# Patient Record
Sex: FEMALE | Employment: OTHER | URBAN - METROPOLITAN AREA
[De-identification: names, ages, dates, MRNs, and addresses within clinical notes are randomized per-mention and may not be internally consistent; named-entity substitution may affect disease eponyms.]

---

## 2017-05-16 ENCOUNTER — HOSPITAL ENCOUNTER (OUTPATIENT)
Dept: LAB | Age: 62
Discharge: HOME | End: 2017-05-16
Payer: COMMERCIAL

## 2017-05-16 ENCOUNTER — OFFICE VISIT (OUTPATIENT)
Dept: HEMATOLOGY | Age: 62
End: 2017-05-16

## 2017-05-16 VITALS
OXYGEN SATURATION: 98 % | HEART RATE: 69 BPM | WEIGHT: 143.2 LBS | DIASTOLIC BLOOD PRESSURE: 71 MMHG | RESPIRATION RATE: 16 BRPM | TEMPERATURE: 97.4 F | HEIGHT: 65 IN | SYSTOLIC BLOOD PRESSURE: 120 MMHG | BODY MASS INDEX: 23.86 KG/M2

## 2017-05-16 DIAGNOSIS — B18.2 CHRONIC HEPATITIS C WITHOUT HEPATIC COMA (HCC): ICD-10-CM

## 2017-05-16 DIAGNOSIS — B18.2 CHRONIC HEPATITIS C WITHOUT HEPATIC COMA (HCC): Primary | ICD-10-CM

## 2017-05-16 PROBLEM — Z90.710 S/P TAH (TOTAL ABDOMINAL HYSTERECTOMY): Status: ACTIVE | Noted: 2017-05-16

## 2017-05-16 PROBLEM — L65.9 ALOPECIA: Status: ACTIVE | Noted: 2017-05-16

## 2017-05-16 PROBLEM — F32.A DEPRESSION: Status: ACTIVE | Noted: 2017-05-16

## 2017-05-16 LAB
ALBUMIN SERPL BCP-MCNC: 3.6 G/DL (ref 3.4–5)
ALBUMIN/GLOB SERPL: 1.1 {RATIO} (ref 0.8–1.7)
ALP SERPL-CCNC: 61 U/L (ref 45–117)
ALT SERPL-CCNC: 65 U/L (ref 13–56)
ANION GAP BLD CALC-SCNC: 10 MMOL/L (ref 3–18)
AST SERPL W P-5'-P-CCNC: 88 U/L (ref 15–37)
BASOPHILS # BLD AUTO: 0 K/UL (ref 0–0.1)
BASOPHILS # BLD: 0 % (ref 0–3)
BILIRUB DIRECT SERPL-MCNC: 0.1 MG/DL (ref 0–0.2)
BILIRUB SERPL-MCNC: 0.4 MG/DL (ref 0.2–1)
BUN SERPL-MCNC: 8 MG/DL (ref 7–18)
BUN/CREAT SERPL: 10 (ref 12–20)
CALCIUM SERPL-MCNC: 9.1 MG/DL (ref 8.5–10.1)
CHLORIDE SERPL-SCNC: 106 MMOL/L (ref 100–108)
CO2 SERPL-SCNC: 28 MMOL/L (ref 21–32)
CREAT SERPL-MCNC: 0.84 MG/DL (ref 0.6–1.3)
DIFFERENTIAL METHOD BLD: ABNORMAL
EOSINOPHIL # BLD: 0 K/UL (ref 0–0.4)
EOSINOPHIL NFR BLD: 1 % (ref 0–5)
ERYTHROCYTE [DISTWIDTH] IN BLOOD BY AUTOMATED COUNT: 12.6 % (ref 11.6–14.5)
ETHANOL SERPL-MCNC: 24 MG/DL (ref 0–3)
FERRITIN SERPL-MCNC: 638 NG/ML (ref 8–388)
GLOBULIN SER CALC-MCNC: 3.4 G/DL (ref 2–4)
GLUCOSE SERPL-MCNC: 86 MG/DL (ref 74–99)
HCT VFR BLD AUTO: 36.6 % (ref 35–45)
HGB BLD-MCNC: 12.5 G/DL (ref 12–16)
IRON SATN MFR SERPL: 38 %
IRON SERPL-MCNC: 125 UG/DL (ref 50–175)
LYMPHOCYTES # BLD AUTO: 73 % (ref 20–51)
LYMPHOCYTES # BLD: 2.8 K/UL (ref 0.8–3.5)
MCH RBC QN AUTO: 31.7 PG (ref 24–34)
MCHC RBC AUTO-ENTMCNC: 34.2 G/DL (ref 31–37)
MCV RBC AUTO: 92.9 FL (ref 74–97)
MONOCYTES # BLD: 0.3 K/UL (ref 0–1)
MONOCYTES NFR BLD AUTO: 7 % (ref 2–9)
NEUTS SEG # BLD: 0.7 K/UL (ref 1.8–8)
NEUTS SEG NFR BLD AUTO: 19 % (ref 42–75)
PLATELET # BLD AUTO: 159 K/UL (ref 135–420)
PMV BLD AUTO: 10.9 FL (ref 9.2–11.8)
POTASSIUM SERPL-SCNC: 3.7 MMOL/L (ref 3.5–5.5)
PROT SERPL-MCNC: 7 G/DL (ref 6.4–8.2)
RBC # BLD AUTO: 3.94 M/UL (ref 4.2–5.3)
RBC MORPH BLD: ABNORMAL
SODIUM SERPL-SCNC: 144 MMOL/L (ref 136–145)
TIBC SERPL-MCNC: 332 UG/DL (ref 250–450)
WBC # BLD AUTO: 3.8 K/UL (ref 4.6–13.2)

## 2017-05-16 PROCEDURE — 86706 HEP B SURFACE ANTIBODY: CPT | Performed by: INTERNAL MEDICINE

## 2017-05-16 PROCEDURE — 82728 ASSAY OF FERRITIN: CPT | Performed by: INTERNAL MEDICINE

## 2017-05-16 PROCEDURE — 86704 HEP B CORE ANTIBODY TOTAL: CPT | Performed by: INTERNAL MEDICINE

## 2017-05-16 PROCEDURE — 80048 BASIC METABOLIC PNL TOTAL CA: CPT | Performed by: INTERNAL MEDICINE

## 2017-05-16 PROCEDURE — 87900 PHENOTYPE INFECT AGENT DRUG: CPT | Performed by: INTERNAL MEDICINE

## 2017-05-16 PROCEDURE — 86708 HEPATITIS A ANTIBODY: CPT | Performed by: INTERNAL MEDICINE

## 2017-05-16 PROCEDURE — 82107 ALPHA-FETOPROTEIN L3: CPT | Performed by: INTERNAL MEDICINE

## 2017-05-16 PROCEDURE — 83540 ASSAY OF IRON: CPT | Performed by: INTERNAL MEDICINE

## 2017-05-16 PROCEDURE — 80307 DRUG TEST PRSMV CHEM ANLYZR: CPT | Performed by: INTERNAL MEDICINE

## 2017-05-16 PROCEDURE — 36415 COLL VENOUS BLD VENIPUNCTURE: CPT | Performed by: INTERNAL MEDICINE

## 2017-05-16 PROCEDURE — 87522 HEPATITIS C REVRS TRNSCRPJ: CPT | Performed by: INTERNAL MEDICINE

## 2017-05-16 PROCEDURE — 85025 COMPLETE CBC W/AUTO DIFF WBC: CPT | Performed by: INTERNAL MEDICINE

## 2017-05-16 PROCEDURE — 87902 NFCT AGT GNTYP ALYS HEP C: CPT | Performed by: INTERNAL MEDICINE

## 2017-05-16 PROCEDURE — 80076 HEPATIC FUNCTION PANEL: CPT | Performed by: INTERNAL MEDICINE

## 2017-05-16 PROCEDURE — 87521 HEPATITIS C PROBE&RVRS TRNSC: CPT | Performed by: INTERNAL MEDICINE

## 2017-05-16 NOTE — MR AVS SNAPSHOT
Visit Information Date & Time Provider Department Dept. Phone Encounter #  
 5/16/2017 10:30 AM Althea Rae MD [address] [id number] Upcoming Health Maintenance Date Due Hepatitis C Screening 1955 Pneumococcal 19-64 Medium Risk (1 of 1 - PPSV23) 10/29/1976 DTaP/Tdap/Td series (1 - Tdap) 10/29/1978 PAP AKA CERVICAL CYTOLOGY 10/29/1978 BREAST CANCER SCRN MAMMOGRAM 10/29/2007 FOBT Q 1 YEAR AGE 50-75 10/29/2007 INFLUENZA AGE 9 TO ADULT 8/1/2017 Allergies as of 5/16/2017  Review Complete On: 5/16/2017 By: Althea Rae MD  
  
 Severity Noted Reaction Type Reactions Alcohol  06/05/2015    Hives, Itching Wool alcohol, found in lotions and soaps Other Medication  06/02/2015    Hives Antibiotic,  \"Nedrine\" Current Immunizations  Never Reviewed No immunizations on file. Not reviewed this visit You Were Diagnosed With   
  
 Codes Comments Chronic hepatitis C without hepatic coma (HCC)    -  Primary ICD-10-CM: B18.2 ICD-9-CM: 070.54 Vitals BP Pulse Temp Resp Height(growth percentile) 120/71 (BP 1 Location: Left arm, BP Patient Position: Sitting) 69 97.4 °F (36.3 °C) (Tympanic) 16 5' 5\" (1.651 m) Weight(growth percentile) SpO2 BMI OB Status Smoking Status 143 lb 3.2 oz (65 kg) 98% 23.83 kg/m2 Hysterectomy Current Every Day Smoker Vitals History BMI and BSA Data Body Mass Index Body Surface Area  
 23.83 kg/m 2 1.73 m 2 Preferred Pharmacy Pharmacy Name Phone [id number], [address] [place] [address]. [phone] Your Updated Medication List  
  
Notice  As of 5/16/2017 11:36 AM  
 You have not been prescribed any medications. To-Do List   
 05/16/2017 Lab:  AFP WITH AFP-L3% 05/16/2017 Lab:  CBC WITH AUTOMATED DIFF   
  
 05/16/2017 Lab:  ETHYL ALCOHOL   
  
 05/16/2017 Lab:  FERRITIN   
  
 05/16/2017 Lab:  HCV NS5A DRUG RESISTANCE ASSAY   
  
 05/16/2017 Lab:  HCV RNA BY NORBERT QL,RFLX TO QT   
  
 05/16/2017 Lab:  HEP A AB, TOTAL   
  
 05/16/2017 Lab:  HEP B SURFACE AB   
  
 05/16/2017 Lab:  HEP C GENOTYPE   
  
 05/16/2017 Lab:  HEPATIC FUNCTION PANEL   
  
 05/16/2017 Lab:  HEPATITIS B CORE AB, TOTAL   
  
 05/16/2017 Lab:  IRON PROFILE   
  
 05/16/2017 Lab:  METABOLIC PANEL, BASIC   
  
 05/16/2017 Imaging:  US ABD LTD W ELASTOGRAPHY Introducing [facility]! Melodie Strickland introduces [facility] patient portal. Now you can access parts of your medical record, email your doctor's office, and request medication refills online. 1. In your internet browser, go to [email or web address] 2. Click on the First Time User? Click Here link in the Sign In box. You will see the New Member Sign Up page. 3. Enter your [facility] Access Code exactly as it appears below. You will not need to use this code after youve completed the sign-up process. If you do not sign up before the expiration date, you must request a new code. · [facility] Access Code: [email or web address] Expires: 8/14/2017 11:36 AM 
 
4. Enter the last four digits of your Social Security Number (xxxx) and Date of Birth (mm/dd/yyyy) as indicated and click Submit. You will be taken to the next sign-up page. 5. Create a [facility] ID. This will be your [facility] login ID and cannot be changed, so think of one that is secure and easy to remember. 6. Create a [facility] password. You can change your password at any time. 7. Enter your Password Reset Question and Answer. This can be used at a later time if you forget your password. 8. Enter your e-mail address. You will receive e-mail notification when new information is available in [address] 9. Click Sign Up. You can now view and download portions of your medical record. 10. Click the Download Summary menu link to download a portable copy of your medical information. If you have questions, please visit the Frequently Asked Questions section of the [employer] website. Remember, [facility] is NOT to be used for urgent needs. For medical emergencies, dial 911. Now available from your iPhone and Android! Please provide this summary of care documentation to your next provider. Your primary care clinician is listed as Floyd Cruz. If you have any questions after today's visit, please call [phone].

## 2017-05-16 NOTE — PROGRESS NOTES
Laine Byrnes MD, IBAN Victoria PA-C Iantha Hooker, MD, FACP, MD Rhiannon Salas NP Soundra Barbara, NP        [address] [facility], [address] [address]  22.     [phone]     FAX: [facility], [address] [place] [address]     [phone]     FAX: [phone]         Patient Care Team:  Mariya Luong MD as PCP - General (Family Practice)      Problem List  Date Reviewed: 5/16/2017          Codes Class Noted    Chronic hepatitis C ([facility] [address] ICD-10-CM: B18.2  ICD-9-CM: 070.54  5/16/2017        Depression ICD-10-CM: F32.9  ICD-9-CM: 697  5/16/2017        Alopecia ICD-10-CM: L65.9  ICD-9-CM: 704.00  5/16/2017        S/P ALKA (total abdominal hysterectomy) ICD-10-CM: Z90.710  ICD-9-CM: V88.01  5/16/2017        Hidradenitis suppurativa of left axilla ICD-10-CM: L73.2  ICD-9-CM: 705.83  6/5/2015                The physicians listed above have asked me to see Ezequiel De Leon in consultation regarding chronic HCV and its management. All medical records sent by the referring physicians were reviewed     The patient is a 63 y.o. Black female who was noted to have abnormalities in liver chemistries and subsequently tested positive for chronic HCV in 4/2017. Risk factors for acquiring HCV are inhaling cocaine in 1970s. There was no history of acute incteric hepatitis at the time of these risk factors. Imaging of the liver was either not performed or the results are not available to me. An assessment of liver fibrosis with biopsy or elastography has not been performed. The patient has never received treatment for chronic HCV. The most recent laboratory studies are not available. The patient has no symptoms which can be attributed to the liver disorder.     The patient has not experienced pain in the right side over the liver,     The patient has Moderate limitations in functional activities which can be attributed to to other medical problems that are not related to the liver disease. ALLERGIES  Allergies   Allergen Reactions    Alcohol Hives and Itching     Wool alcohol, found in lotions and soaps    Other Medication Hives     Antibiotic,  \"Nedrine\"       MEDICATIONS  No current outpatient prescriptions on file. No current facility-administered medications for this visit. SYSTEM REVIEW NOT RELATED TO LIVER DISEASE OR REVIEWED ABOVE:  Constitution systems: Negative for fever, chills, weight gain, weight loss. Eyes: Negative for visual changes. ENT: Negative for sore throat, painful swallowing. Respiratory: Negative for cough, hemoptysis, SOB. Cardiology: Negative for chest pain, palpitations. GI:  Negative for constipation or diarrhea. : Negative for urinary frequency, dysuria, hematuria, nocturia. Skin: Negative for rash. Hematology: Negative for easy bruising, blood clots. Musculo-skelatal: Negative for back pain, muscle pain, weakness. Neurologic: Negative for headaches, dizziness, vertigo, memory problems not related to HE. Psychology: Negative for anxiety, depression. FAMILY HISTORY:  The patient has no knowledge of the father's medical condition. The mother has the following chronic diseases: HTN. There is no family history of liver disease. SOCIAL HISTORY:  The patient is . The spouse has not been tested for HCV   The patient has 3 children, and 5 grandchildren. The patient currently smokes half pack of tobacco daily. The patient consumes 2 pints plus some beer weekly. The patient used to work as a  for [employer]. The patient retired in 2013.         PHYSICAL EXAMINATION:  /71 (BP 1 Location: Left arm, BP Patient Position: Sitting)  Pulse 69  Temp 97.4 °F (36.3 °C) (Tympanic)   Resp 16  Ht 5' 5\" (1.651 m)  Wt 143 lb 3.2 oz (65 kg)  SpO2 98%  BMI 23.83 kg/m2  General: No acute distress. Eyes: Sclera anicteric. ENT: No oral lesions. Thyroid normal.  Nodes: No adenopathy. Skin: No spider angiomata. No jaundice. No palmar erythema. Respiratory: Lungs clear to auscultation. Cardiovascular: Regular heart rate. No murmurs. No JVD. Abdomen: Soft non-tender. Liver size normal to percussion/palpation. Spleen not palpable. No obvious ascites. Extremities: No edema. No muscle wasting. No gross arthritic changes. Neurologic: Alert and oriented. Cranial nerves grossly intact. No asterixis. LABORATORY STUDIES:  [facility] [place] [address] & Units 5/16/2017   WBC 4.6 - 13.2 K/uL 3.8 (L)   ANC 1.8 - 8.0 K/UL 0.7 (L)   HGB 12.0 - 16.0 g/dL 12.5    - 420 K/uL 159   AST 15 - 37 U/L 88 (H)   ALT 13 - 56 U/L 65 (H)   Alk Phos 45 - 117 U/L 61   Bili, Total 0.2 - 1.0 MG/DL 0.4   Bili, Direct 0.0 - 0.2 MG/DL 0.1   Albumin 3.4 - 5.0 g/dL 3.6   BUN 7.0 - 18 MG/DL 8   Creat 0.6 - 1.3 MG/DL 0.84   Na 136 - 145 mmol/L 144   K 3.5 - 5.5 mmol/L 3.7   Cl 100 - 108 mmol/L 106   CO2 21 - 32 mmol/L 28   Glucose 74 - 99 mg/dL 86     SEROLOGIES:  Serologies Latest Ref Rng & Units 5/16/2017   Hep A Ab, Total NEGATIVE   NEGATIVE   Hep B Core Ab, Total NEGATIVE   NEGATIVE   Hep B Surface Ab >10.0 mIU/mL <3.10 (L)   Hep B Surface Ab Interp POS   NEGATIVE (A)   Hep C Genotype  1b   HCV RT-PCR, Quant IU/mL [id number]   Ferritin 8 - 388 NG/ (H)   Iron % Saturation % 38     LIVER HISTOLOGY:  Not available or performed    ENDOSCOPIC PROCEDURES:  Not available or performed    RADIOLOGY:  Not available or performed    OTHER TESTING:  Not available or performed    ASSESSMENT AND PLAN:  Chronic HCV of unclear severity. Liver function is normal.  The platelet count is normal.      Have performed laboratory testing to monitor liver function and degree of liver injury. This included BMP, hepatic panel, CBC with platelet count,     Will perform and/or review results of HCV viral load and HCV genotype to define the specific treatment and duration of treatment that will be required. Will perform serologic and virologic studies to assess for other causes of chronic liver disease. Will perform imaging of the liver with ultrasound. The need to assess liver fibrosis was discussed. Sheer wave elastography can assess liver fibrosis and determine if a patient has advanced fibrosis or cirrhosis without the need for liver biopsy. Sheer wave elast[facility] is [facility] available at [facility]. This will be scheduled. The patient has not previously been treated for HCV. The patient has HCV genotype 1B. Discussed the treatment alternatives. The SVR/cure rate for HCV now exceeds 90% with just oral anti-viral therapy and no interferon injections or significant side effects for most patients with HCV. The specific treatment is dependent upon genotype, viral load and histology. The patient should be treated with Sharlet Rincon or [employer]. The SVR/cure rate for either of these regimens exceeds 95%. The patient was directed to continue all current medications at the current dosages. There are no contraindications for the patient to take any medications that are necessary for treatment of other medical issues. The patient was counseled regarding alcohol consumption. The patient was counseled regarding use of illicit drugs. Vaccination for viral hepatitis A and B is recommended since the patient has no serologic evidence of previous exposure or vaccination with immunity. All of the above issues were discussed with the patient. All questions were answered. The patient expressed a clear understanding of the above. [facility] 87 in 4 weeks to initiate HCV treatment.     Devan Brown MD  [facility] [place] [facility] [place]    [facility], [address] [place] [address], [facility] [address]  [phone]

## 2017-05-17 LAB
AFP L3 MFR SERPL: 4.7 % (ref 0–9.9)
AFP SERPL-MCNC: 7.9 NG/ML (ref 0–8)
HAV AB SER QL IA: NEGATIVE
HBV CORE AB SERPL QL IA: NEGATIVE
HBV SURFACE AB SER QL IA: NEGATIVE
HBV SURFACE AB SERPL IA-ACNC: <3.1 MIU/ML
HEP BS AB COMMENT,HBSAC: ABNORMAL

## 2017-05-18 LAB
HCV RNA SERPL NAA+PROBE-ACNC: NORMAL IU/ML
HCV RNA SERPL NAA+PROBE-LOG IU: 6.13 LOG10 IU/ML
HCV RNA SERPL QL NAA+PROBE: POSITIVE
TEST INFORMATION:, 550031: NORMAL

## 2017-05-20 LAB
HCV GENTYP SERPL NAA+PROBE: NORMAL
PLEASE NOTE, 550474: NORMAL

## 2017-05-26 LAB
HCV NS5 MUT DET ISLT GENOTYP: NORMAL
HCV RESIS PANELL ISLT GENOTYP: NORMAL
REF LAB TEST METHOD: NORMAL

## 2017-06-01 ENCOUNTER — TELEPHONE (OUTPATIENT)
Dept: HEMATOLOGY | Age: 62
End: 2017-06-01

## 2017-06-06 ENCOUNTER — HOSPITAL ENCOUNTER (OUTPATIENT)
Dept: ULTRASOUND IMAGING | Age: 62
Discharge: HOME | End: 2017-06-06
Attending: INTERNAL MEDICINE
Payer: COMMERCIAL

## 2017-06-06 ENCOUNTER — TELEPHONE (OUTPATIENT)
Dept: HEMATOLOGY | Age: 62
End: 2017-06-06

## 2017-06-06 DIAGNOSIS — B18.2 CHRONIC HEPATITIS C WITHOUT HEPATIC COMA (HCC): ICD-10-CM

## 2017-06-06 PROCEDURE — 0346T US ABD LTD W ELASTOGRAPHY: CPT

## 2017-06-12 NOTE — TELEPHONE ENCOUNTER
Returned pts call re lab results. Pt verbalized understanding, we will see pt at next scheduled f/u.

## 2017-07-24 ENCOUNTER — HOSPITAL ENCOUNTER (OUTPATIENT)
Dept: LAB | Age: 62
Discharge: HOME | End: 2017-07-24
Payer: COMMERCIAL

## 2017-07-24 PROCEDURE — 87070 CULTURE OTHR SPECIMN AEROBIC: CPT | Performed by: SURGERY

## 2017-07-24 PROCEDURE — 87077 CULTURE AEROBIC IDENTIFY: CPT | Performed by: SURGERY

## 2017-07-26 LAB
BACTERIA SPEC CULT: ABNORMAL
BACTERIA SPEC CULT: ABNORMAL
GRAM STN SPEC: ABNORMAL
GRAM STN SPEC: ABNORMAL
SERVICE CMNT-IMP: ABNORMAL

## 2017-09-19 ENCOUNTER — HOSPITAL ENCOUNTER (OUTPATIENT)
Dept: LAB | Age: 62
Discharge: HOME | End: 2017-09-19
Payer: COMMERCIAL

## 2017-09-19 ENCOUNTER — OFFICE VISIT (OUTPATIENT)
Dept: HEMATOLOGY | Age: 62
End: 2017-09-19

## 2017-09-19 VITALS
DIASTOLIC BLOOD PRESSURE: 66 MMHG | TEMPERATURE: 98.2 F | RESPIRATION RATE: 16 BRPM | BODY MASS INDEX: 23.16 KG/M2 | OXYGEN SATURATION: 96 % | HEART RATE: 71 BPM | HEIGHT: 65 IN | SYSTOLIC BLOOD PRESSURE: 136 MMHG | WEIGHT: 139 LBS

## 2017-09-19 DIAGNOSIS — B18.2 CHRONIC HEPATITIS C WITHOUT HEPATIC COMA (HCC): ICD-10-CM

## 2017-09-19 DIAGNOSIS — B18.2 CHRONIC HEPATITIS C WITHOUT HEPATIC COMA (HCC): Primary | ICD-10-CM

## 2017-09-19 LAB
ALBUMIN SERPL-MCNC: 3.6 G/DL (ref 3.4–5)
ALBUMIN/GLOB SERPL: 1 {RATIO} (ref 0.8–1.7)
ALP SERPL-CCNC: 63 U/L (ref 45–117)
ALT SERPL-CCNC: 72 U/L (ref 13–56)
ANION GAP SERPL CALC-SCNC: 10 MMOL/L (ref 3–18)
AST SERPL-CCNC: 71 U/L (ref 15–37)
BASOPHILS # BLD: 0 K/UL (ref 0–0.06)
BASOPHILS NFR BLD: 0 % (ref 0–2)
BILIRUB DIRECT SERPL-MCNC: 0.1 MG/DL (ref 0–0.2)
BILIRUB SERPL-MCNC: 0.5 MG/DL (ref 0.2–1)
BUN SERPL-MCNC: 16 MG/DL (ref 7–18)
BUN/CREAT SERPL: 17 (ref 12–20)
CALCIUM SERPL-MCNC: 9.8 MG/DL (ref 8.5–10.1)
CHLORIDE SERPL-SCNC: 104 MMOL/L (ref 100–108)
CO2 SERPL-SCNC: 26 MMOL/L (ref 21–32)
CREAT SERPL-MCNC: 0.93 MG/DL (ref 0.6–1.3)
DIFFERENTIAL METHOD BLD: ABNORMAL
EOSINOPHIL # BLD: 0.1 K/UL (ref 0–0.4)
EOSINOPHIL NFR BLD: 1 % (ref 0–5)
ERYTHROCYTE [DISTWIDTH] IN BLOOD BY AUTOMATED COUNT: 12.9 % (ref 11.6–14.5)
ETHANOL SERPL-MCNC: <3 MG/DL (ref 0–3)
GLOBULIN SER CALC-MCNC: 3.5 G/DL (ref 2–4)
GLUCOSE SERPL-MCNC: 86 MG/DL (ref 74–99)
HCT VFR BLD AUTO: 38.2 % (ref 35–45)
HGB BLD-MCNC: 12.6 G/DL (ref 12–16)
LYMPHOCYTES # BLD: 2.2 K/UL (ref 0.9–3.6)
LYMPHOCYTES NFR BLD: 41 % (ref 21–52)
MCH RBC QN AUTO: 31.3 PG (ref 24–34)
MCHC RBC AUTO-ENTMCNC: 33 G/DL (ref 31–37)
MCV RBC AUTO: 94.8 FL (ref 74–97)
MONOCYTES # BLD: 0.5 K/UL (ref 0.05–1.2)
MONOCYTES NFR BLD: 9 % (ref 3–10)
NEUTS SEG # BLD: 2.6 K/UL (ref 1.8–8)
NEUTS SEG NFR BLD: 49 % (ref 40–73)
PLATELET # BLD AUTO: 169 K/UL (ref 135–420)
PMV BLD AUTO: 12 FL (ref 9.2–11.8)
POTASSIUM SERPL-SCNC: 4.2 MMOL/L (ref 3.5–5.5)
PROT SERPL-MCNC: 7.1 G/DL (ref 6.4–8.2)
RBC # BLD AUTO: 4.03 M/UL (ref 4.2–5.3)
SODIUM SERPL-SCNC: 140 MMOL/L (ref 136–145)
WBC # BLD AUTO: 5.3 K/UL (ref 4.6–13.2)

## 2017-09-19 PROCEDURE — 80307 DRUG TEST PRSMV CHEM ANLYZR: CPT | Performed by: NURSE PRACTITIONER

## 2017-09-19 PROCEDURE — 80048 BASIC METABOLIC PNL TOTAL CA: CPT | Performed by: NURSE PRACTITIONER

## 2017-09-19 PROCEDURE — 85025 COMPLETE CBC W/AUTO DIFF WBC: CPT | Performed by: NURSE PRACTITIONER

## 2017-09-19 PROCEDURE — 80076 HEPATIC FUNCTION PANEL: CPT | Performed by: NURSE PRACTITIONER

## 2017-09-19 PROCEDURE — 87522 HEPATITIS C REVRS TRNSCRPJ: CPT | Performed by: NURSE PRACTITIONER

## 2017-09-19 PROCEDURE — 36415 COLL VENOUS BLD VENIPUNCTURE: CPT | Performed by: NURSE PRACTITIONER

## 2017-09-19 RX ORDER — DOXYCYCLINE 100 MG/1
100 CAPSULE ORAL 2 TIMES DAILY
COMMUNITY

## 2017-09-19 RX ORDER — LEDIPASVIR AND SOFOSBUVIR 90; 400 MG/1; MG/1
1 TABLET, FILM COATED ORAL DAILY
Qty: 28 TAB | Refills: 1 | Status: SHIPPED | OUTPATIENT
Start: 2017-09-19 | End: 2017-11-14

## 2017-09-19 RX ORDER — VENLAFAXINE 37.5 MG/1
37.5 TABLET ORAL 3 TIMES DAILY
COMMUNITY

## 2017-09-19 NOTE — MR AVS SNAPSHOT
Visit Information Date & Time Provider Department Dept. Phone Encounter #  
 9/19/2017  3:30 PM IBAN [address] [id number] [phone] Follow-up Instructions Return in about 24 weeks (around 3/6/2018). Upcoming Health Maintenance Date Due Pneumococcal 19-64 Medium Risk (1 of 1 - PPSV23) 10/29/1976 DTaP/Tdap/Td series (1 - Tdap) 10/29/1978 PAP AKA CERVICAL CYTOLOGY 10/29/1978 BREAST CANCER SCRN MAMMOGRAM 10/29/2007 FOBT Q 1 YEAR AGE 50-75 10/29/2007 INFLUENZA AGE 9 TO ADULT 8/1/2017 ZOSTER VACCINE AGE 60> 8/29/2017 Allergies as of 9/19/2017  Review Complete On: 9/19/2017 By: Jennifer Espitia Severity Noted Reaction Type Reactions Alcohol  06/05/2015    Hives, Itching Wool alcohol, found in lotions and soaps Other Medication  06/02/2015    Hives Antibiotic,  \"Nedrine\" Current Immunizations  Never Reviewed No immunizations on file. Not reviewed this visit You Were Diagnosed With   
  
 Codes Comments Chronic hepatitis C without hepatic coma (HCC)    -  Primary ICD-10-CM: B18.2 ICD-9-CM: 070.54 Vitals BP Pulse Temp Resp Height(growth percentile) 136/66 (BP 1 Location: Left arm, BP Patient Position: Sitting) 71 98.2 °F (36.8 °C) (Tympanic) 16 5' 5\" (1.651 m) Weight(growth percentile) SpO2 BMI OB Status Smoking Status 139 lb (63 kg) 96% 23.13 kg/m2 Hysterectomy Current Every Day Smoker BMI and BSA Data Body Mass Index Body Surface Area  
 23.13 kg/m 2 1.7 m 2 Preferred Pharmacy Pharmacy Name Phone [facility] @ [address] [facility], [facility] [address] [phone] Your Updated Medication List  
  
   
This list is accurate as of: 9/19/17  3:58 PM.  Always use your most recent med list.  
  
  
  
  
 doxycycline 100 mg capsule Commonly known as:  Nato Avalos Take 100 mg by mouth two (2) times a day. ledipasvir-sofosbuvir  mg Tab Commonly known as:  Lavena Galas Take 1 Tab by mouth daily for 56 days. Indications: CHRONIC HEPATITIS C - GENOTYPE 1  
  
 PROBIOTIC PO Take  by mouth. venlafaxine 37.5 mg tablet Commonly known as:  [facility] Take 37.5 mg by mouth three (3) times daily. Prescriptions Sent to Pharmacy Refills  
 ledipasvir-sofosbuvir (HARVONI)  mg tab 1 Sig: Take 1 Tab by mouth daily for 56 days. Indications: CHRONIC HEPATITIS C - GENOTYPE 1 Class: Normal  
 Pharmacy: [id number] @ [address] [facility] #: [phone] Route: Oral  
  
Follow-up Instructions Return in about 24 weeks (around 3/6/2018). Introducing [facility]! Sonya Buitrago introduces [employer] patient portal. Now you can access parts of your medical record, email your doctor's office, and request medication refills online. 1. In your internet browser, go to [email or web address] 2. Click on the First Time User? Click Here link in the Sign In box. You will see the New Member Sign Up page. 3. Enter your [employer] Access Code exactly as it appears below. You will not need to use this code after youve completed the sign-up process. If you do not sign up before the expiration date, you must request a new code. · [employer] Access Code: [email or web address] Expires: 12/18/2017  3:58 PM 
 
4. Enter the last four digits of your Social Security Number (xxxx) and Date of Birth (mm/dd/yyyy) as indicated and click Submit. You will be taken to the next sign-up page. 5. Create a [facility] [facility] ID. This will be your [employer] login ID and cannot be changed, so think of one that is secure and easy to remember. 6. Create a [employer] password. You can change your password at any time. 7. Enter your Password Reset Question and Answer. This can be used at a later time if you forget your password. 8. Enter your e-mail address. You will receive e-mail notification when new information is available in [address] 9. Click Sign Up. You can now view and download portions of your medical record. 10. Click the Download Summary menu link to download a portable copy of your medical information. If you have questions, please visit the Frequently Asked Questions section of the [employer] website. Remember, [employer] is NOT to be used for urgent needs. For medical emergencies, dial 911. Now available from your iPhone and Android! Please provide this summary of care documentation to your next provider. Your primary care clinician is listed as Kirstin Gonzales. If you have any questions after today's visit, please call [phone].

## 2017-09-19 NOTE — PROGRESS NOTES
[address] MD [place], [address], [place], [place]       IBAN Owens PA-C Stan Harm, [facility]   IBAN Portillo NP        at [address]     [facility] [facility], [address] [address]     [phone]     FAX: [phone]    at [address], [address] [facility], [address]     [phone]     FAX: [phone]           Patient Care Team:  Leeanna Dakin, MD as PCP - General (Family Practice)      Problem List  Date Reviewed: 9/19/2017          Codes Class Noted    Chronic hepatitis C ([facility] [address] ICD-10-CM: B18.2  ICD-9-CM: 070.54  5/16/2017        Depression ICD-10-CM: F32.9  ICD-9-CM: 690  5/16/2017        Alopecia ICD-10-CM: L65.9  ICD-9-CM: 704.00  5/16/2017        S/P ALKA (total abdominal hysterectomy) ICD-10-CM: Z90.710  ICD-9-CM: V88.01  5/16/2017        Hidradenitis suppurativa of left axilla ICD-10-CM: L73.2  ICD-9-CM: 705.83  6/5/2015                The physicians listed above have asked me to see Lulú Arredondo in consultation regarding chronic HCV and its management. All medical records sent by the referring physicians were reviewed     The patient is a 63 y.o. Black female who was noted to have abnormalities in liver chemistries and subsequently tested positive for chronic HCV in 4/2017. Risk factors for acquiring HCV are inhaling cocaine in 1970s. There was no history of acute incteric hepatitis at the time of these risk factors. Imaging of the liver was performed with ultrasound in 06/2017. Liver demonstrates anechoic cysts in the right lobe, largest measuring  1.7 cm, and mildly coarse parenchymal background. No suspicious hepatic mass. Shear wave elastography was performed with the ultrasound. This suggests the liver has mild fibrosis, F1 on the Metavir scale. A liver biopsy has not been performed.      The patient has never received treatment for chronic HCV. The most recent laboratory studies indicate that the liver transaminases are elevated, alkaline phosphatase is normal, tests of hepatic synthetic and metabolic function are normal, and the platelet count is normal.      The patient has no complaints which can be attributed to liver disease. The patient has Moderate limitations in functional activities which can be attributed to to other medical problems that are not related to the liver disease. The patient has not experienced fatigue, fevers, chills, shortness of breath, chest pain, pain in the right side over the liver, diffuse abdominal pain, nausea, vomiting, constipation, diarrhrea, dry eyes, dry mouth, arthralgias, myalgias, yellowing of the eyes or skin, itching, dark urine, problems concentrating, swelling of the abdomen, swelling of the lower extremities, hematemesis, or hematochezia. ALLERGIES  Allergies   Allergen Reactions    Alcohol Hives and Itching     Wool alcohol, found in lotions and soaps    Other Medication Hives     Antibiotic,  \"Nedrine\"       MEDICATIONS  Current Outpatient Prescriptions   Medication Sig    venlafaxine (EFFEXOR) 37.5 mg tablet Take 37.5 mg by mouth three (3) times daily.  doxycycline (MONODOX) 100 mg capsule Take 100 mg by mouth two (2) times a day.  LACTOBACILLUS ACIDOPHILUS (PROBIOTIC PO) Take  by mouth. No current facility-administered medications for this visit. SYSTEM REVIEW NOT RELATED TO LIVER DISEASE OR REVIEWED ABOVE:  Constitution systems: Negative for fever, chills, weight gain, weight loss. Eyes: Negative for visual changes. ENT: Negative for sore throat, painful swallowing. Respiratory: Negative for cough, hemoptysis, SOB. Cardiology: Negative for chest pain, palpitations. GI:  Negative for constipation or diarrhea. : Negative for urinary frequency, dysuria, hematuria, nocturia.    Skin: Negative for rash.  Hematology: Negative for easy bruising, blood clots. Musculo-skelatal: Negative for back pain, muscle pain, weakness. Neurologic: Negative for headaches, dizziness, vertigo, memory problems not related to HE. Psychology: Negative for anxiety, depression. FAMILY HISTORY:  The patient has no knowledge of the father's medical condition. The mother has the following chronic diseases: HTN. There is no family history of liver disease. SOCIAL HISTORY:  The patient is . The spouse has not been tested for HCV   The patient has 3 children, and 5 grandchildren. The patient currently smokes half pack of tobacco daily. The patient consumes 2 pints plus some beer weekly. The patient used to work as a  for [employer]. The patient retired in 2013. PHYSICAL EXAMINATION:  /66 (BP 1 Location: Left arm, BP Patient Position: Sitting)  Pulse 71  Temp 98.2 °F (36.8 °C) (Tympanic)   Resp 16  Ht 5' 5\" (1.651 m)  Wt 139 lb (63 kg)  SpO2 96%  BMI 23.13 kg/m2  General: No acute distress. Eyes: Sclera anicteric. ENT: No oral lesions. Thyroid normal.  Nodes: No adenopathy. Skin: No spider angiomata. No jaundice. No palmar erythema. Respiratory: Lungs clear to auscultation. Cardiovascular: Regular heart rate. No murmurs. No JVD. Abdomen: Soft non-tender. Liver size normal to percussion/palpation. Spleen not palpable. No obvious ascites. Extremities: No edema. No muscle wasting. No gross arthritic changes. Neurologic: Alert and oriented. Cranial nerves grossly intact. No asterixis.     LABORATORY STUDIES:  Liver [place] of [address] [place] [address] & Units 9/19/2017 5/16/2017   WBC 4.6 - 13.2 K/uL 5.3 3.8 (L)   ANC 1.8 - 8.0 K/UL 2.6 0.7 (L)   HGB 12.0 - 16.0 g/dL 12.6 12.5    - 420 K/uL 169 159   AST 15 - 37 U/L 71 (H) 88 (H)   ALT 13 - 56 U/L 72 (H) 65 (H)   Alk Phos 45 - 117 U/L 63 61   Bili, Total 0.2 - 1.0 MG/DL 0.5 0.4   Bili, Direct 0.0 - 0.2 MG/DL 0.1 0.1   Albumin 3.4 - 5.0 g/dL 3.6 3.6   BUN 7.0 - 18 MG/DL 16 8   Creat 0.6 - 1.3 MG/DL 0.93 0.84   Na 136 - 145 mmol/L 140 144   K 3.5 - 5.5 mmol/L 4.2 3.7   Cl 100 - 108 mmol/L 104 106   CO2 21 - 32 mmol/L 26 28   Glucose 74 - 99 mg/dL 86 86     SEROLOGIES:  Serologies Latest Ref Rng & Units 5/16/2017   Hep A Ab, Total NEGATIVE   NEGATIVE   Hep B Core Ab, Total NEGATIVE   NEGATIVE   Hep B Surface Ab >10.0 mIU/mL <3.10 (L)   Hep B Surface Ab Interp POS   NEGATIVE (A)   Hep C Genotype  1b   HCV RT-PCR, Quant IU/mL [id number]   Ferritin 8 - 388 NG/ (H)   Iron % Saturation % 38     LIVER HISTOLOGY:  06/2017. Shear wave elastography. E Range: 4.61-7.81 kPa, E Mean: 6.05 kPa, E Median: 5.89 kPa, E Std: 0.94 kPa. ENDOSCOPIC PROCEDURES:  Not available or performed    RADIOLOGY:  06/2017. Ultrasound of the liver. No suspicious hepatic mass. Simple appearing hepatic cysts. OTHER TESTING:  Not available or performed    ASSESSMENT AND PLAN:  Chronic HCV with portal fibrosis. Today's laboratory studies indicate that the liver transaminases are elevated, alkaline phosphatase is normal, tests of hepatic synthetic and metabolic function are normal, and the platelet count is normal. ETOH screen is negative. DOA and HCV RNA still pending. Recent ultrasound and shear wave elasatography suggest mild hepatic fibrosis. No suspicious liver masses. Simple appearing liver cysts. HCV. The patient has genotype 1B and is treatment naive. Her viral load is well below 6,000,000 iU/mL. We discussed treatment options. One treatment regime is Harvoni, a combination pill of 400 mg sofosbuvir and 90 mg ledipasvir. The treatment regime is one tablet daily for 8 weeks. She would like to be treated with this regime. This was ordered during this visit through [facility]  in [place].       I explained to her that her insurance carrier may deny coverage of the Harvoni due to this medications extreme cost and her apparent mild disease. This regime was explained in detail, including side effects and dosing. An HCV practice agreement was signed. Educational material was provided. I have explained to the patient that I have ordered the Madelin Weldon from our specialty pharmacy, and the if her insurance carrier approves coverage of the medication, the Madelin Weldon will be delivered to her home in two separate shipment of 28 tabs each. She may begin taking the treatment medications as soon as they arrive. She is to call and make an appointment for treatment week #4 once she begins therapy. She voiced understanding of this plan. The patient was directed to continue all current medications at the current dosages. There are no contraindications for the patient to take any medications that are necessary for treatment of other medical issues. The patient was counseled regarding alcohol consumption. The patient was counseled regarding use of illicit drugs. Vaccination for viral hepatitis A and B is recommended since the patient has no serologic evidence of previous exposure or vaccination with immunity. All of the above issues were discussed with the patient. All questions were answered. The patient expressed a clear understanding of the above. 25 minutes total time spent with this patient with more than 50% of this time spent counseling and coordinating care as described above. [facility] 87 in 24 weeks.      Eric Mas NP   Liver [place] of [address] [facility], 8303 [facility], [address] [place] [address]   [phone]

## 2017-09-21 LAB
AMPHETAMINES SERPL QL SCN: NEGATIVE NG/ML
BARBITURATES SERPL QL SCN: NEGATIVE UG/ML
CANNABINOIDS SERPL QL SCN: NEGATIVE NG/ML
COCAINE+BZE SERPL QL SCN: NEGATIVE NG/ML
OPIATES SERPL QL SCN: NEGATIVE NG/ML
OXYCODONE, 790407: NEGATIVE NG/ML
PCP SERPL QL SCN: NEGATIVE NG/ML

## 2017-10-18 LAB
HCV GRAPH, HCVGR: NORMAL
HCV RNA SERPL NAA+PROBE-ACNC: NORMAL IU/ML
HCV RNA SERPL NAA+PROBE-LOG IU: 4.93 LOG10 IU/ML
SERIAL MONITORING: NORMAL

## 2017-11-27 ENCOUNTER — HOSPITAL ENCOUNTER (OUTPATIENT)
Dept: LAB | Age: 62
Discharge: HOME | End: 2017-11-27
Payer: COMMERCIAL

## 2017-11-27 ENCOUNTER — OFFICE VISIT (OUTPATIENT)
Dept: HEMATOLOGY | Age: 62
End: 2017-11-27

## 2017-11-27 VITALS
SYSTOLIC BLOOD PRESSURE: 115 MMHG | TEMPERATURE: 97.8 F | DIASTOLIC BLOOD PRESSURE: 61 MMHG | HEIGHT: 65 IN | BODY MASS INDEX: 23.66 KG/M2 | WEIGHT: 142 LBS | OXYGEN SATURATION: 99 % | HEART RATE: 67 BPM

## 2017-11-27 DIAGNOSIS — B18.2 CHRONIC HEPATITIS C WITHOUT HEPATIC COMA (HCC): Primary | ICD-10-CM

## 2017-11-27 DIAGNOSIS — B18.2 CHRONIC HEPATITIS C WITHOUT HEPATIC COMA (HCC): ICD-10-CM

## 2017-11-27 LAB
ALBUMIN SERPL-MCNC: 3.2 G/DL (ref 3.4–5)
ALBUMIN/GLOB SERPL: 0.9 {RATIO} (ref 0.8–1.7)
ALP SERPL-CCNC: 67 U/L (ref 45–117)
ALT SERPL-CCNC: 27 U/L (ref 13–56)
ANION GAP SERPL CALC-SCNC: 11 MMOL/L (ref 3–18)
AST SERPL-CCNC: 24 U/L (ref 15–37)
BASOPHILS # BLD: 0 K/UL (ref 0–0.06)
BASOPHILS NFR BLD: 1 % (ref 0–2)
BILIRUB DIRECT SERPL-MCNC: 0.1 MG/DL (ref 0–0.2)
BILIRUB SERPL-MCNC: 0.4 MG/DL (ref 0.2–1)
BUN SERPL-MCNC: 15 MG/DL (ref 7–18)
BUN/CREAT SERPL: 17 (ref 12–20)
CALCIUM SERPL-MCNC: 9.2 MG/DL (ref 8.5–10.1)
CHLORIDE SERPL-SCNC: 102 MMOL/L (ref 100–108)
CO2 SERPL-SCNC: 26 MMOL/L (ref 21–32)
CREAT SERPL-MCNC: 0.88 MG/DL (ref 0.6–1.3)
DIFFERENTIAL METHOD BLD: ABNORMAL
EOSINOPHIL # BLD: 0.1 K/UL (ref 0–0.4)
EOSINOPHIL NFR BLD: 2 % (ref 0–5)
ERYTHROCYTE [DISTWIDTH] IN BLOOD BY AUTOMATED COUNT: 12.8 % (ref 11.6–14.5)
GLOBULIN SER CALC-MCNC: 3.5 G/DL (ref 2–4)
GLUCOSE SERPL-MCNC: 88 MG/DL (ref 74–99)
HCT VFR BLD AUTO: 36.3 % (ref 35–45)
HGB BLD-MCNC: 12 G/DL (ref 12–16)
LYMPHOCYTES # BLD: 1.8 K/UL (ref 0.9–3.6)
LYMPHOCYTES NFR BLD: 44 % (ref 21–52)
MCH RBC QN AUTO: 31.4 PG (ref 24–34)
MCHC RBC AUTO-ENTMCNC: 33.1 G/DL (ref 31–37)
MCV RBC AUTO: 95 FL (ref 74–97)
MONOCYTES # BLD: 0.4 K/UL (ref 0.05–1.2)
MONOCYTES NFR BLD: 11 % (ref 3–10)
NEUTS SEG # BLD: 1.8 K/UL (ref 1.8–8)
NEUTS SEG NFR BLD: 42 % (ref 40–73)
PLATELET # BLD AUTO: 196 K/UL (ref 135–420)
PMV BLD AUTO: 11.1 FL (ref 9.2–11.8)
POTASSIUM SERPL-SCNC: 4.2 MMOL/L (ref 3.5–5.5)
PROT SERPL-MCNC: 6.7 G/DL (ref 6.4–8.2)
RBC # BLD AUTO: 3.82 M/UL (ref 4.2–5.3)
SODIUM SERPL-SCNC: 139 MMOL/L (ref 136–145)
WBC # BLD AUTO: 4.2 K/UL (ref 4.6–13.2)

## 2017-11-27 PROCEDURE — 80076 HEPATIC FUNCTION PANEL: CPT | Performed by: NURSE PRACTITIONER

## 2017-11-27 PROCEDURE — 36415 COLL VENOUS BLD VENIPUNCTURE: CPT | Performed by: NURSE PRACTITIONER

## 2017-11-27 PROCEDURE — 80048 BASIC METABOLIC PNL TOTAL CA: CPT | Performed by: NURSE PRACTITIONER

## 2017-11-27 PROCEDURE — 85025 COMPLETE CBC W/AUTO DIFF WBC: CPT | Performed by: NURSE PRACTITIONER

## 2017-11-27 PROCEDURE — 87522 HEPATITIS C REVRS TRNSCRPJ: CPT | Performed by: NURSE PRACTITIONER

## 2017-11-27 NOTE — PROGRESS NOTES
[address] Hoffman MD, [address], [place], [place]       IBAN Leigh PA-C Raj Castor, [facility]   IBAN Yoo NP        at [address], [address] [address]     [phone]     FAX: [phone]    at [address], [address] Located within [facility], [address]     [phone]     FAX: [phone]           Patient Care Team:  Salinas Guzmán MD as PCP - General (Family Practice)      Problem List  Date Reviewed: 9/19/2017          Codes Class Noted    Chronic hepatitis C ([facility] [address] ICD-10-CM: B18.2  ICD-9-CM: 070.54  5/16/2017        Depression ICD-10-CM: F32.9  ICD-9-CM: 340  5/16/2017        Alopecia ICD-10-CM: L65.9  ICD-9-CM: 704.00  5/16/2017        S/P ALKA (total abdominal hysterectomy) ICD-10-CM: Z90.710  ICD-9-CM: V88.01  5/16/2017        Hidradenitis suppurativa of left axilla ICD-10-CM: L73.2  ICD-9-CM: 705.83  6/5/2015              Tiara Light returns to the [place] [address] today for education and management of chronic hepatitis C. The patient began HCV treatment on 10/26/2017 with once daily Harvoni. She will be treated for 8 weeks total.  This is the only time the HCV has ever been treated. The active problem list, all pertinent past medical history, medications, liver histology, endoscopic studies, radiologic findings and laboratory findings related to the liver disorder were reviewed with the patient. The patient is a 63 y.o. Black female who was noted to have abnormalities in liver chemistries and subsequently tested positive for chronic HCV in 4/2017. Risk factors for acquiring HCV are inhaling cocaine in 1970s. There was no history of acute incteric hepatitis at the time of these risk factors. Imaging of the liver was performed with ultrasound in 06/2017.   Liver demonstrates anechoic cysts in the right lobe, largest measuring  1.7 cm, and mildly coarse parenchymal background. No suspicious hepatic mass. Shear wave elastography was performed with the ultrasound. This suggests the liver has mild fibrosis, F1 on the Metavir scale. A liver biopsy has not been performed. The most recent laboratory studies indicate that the liver transaminases are normal, alkaline phosphatase is normal, tests of hepatic synthetic and metabolic function are normal, and the platelet count is normal.      The patient has no complaints which can be attributed to liver disease. The patient has Moderate limitations in functional activities which can be attributed to to other medical problems that are not related to the liver disease. The patient has not experienced fatigue, fevers, chills, shortness of breath, chest pain, pain in the right side over the liver, diffuse abdominal pain, nausea, vomiting, constipation, diarrhrea, dry eyes, dry mouth, arthralgias, myalgias, yellowing of the eyes or skin, itching, dark urine, problems concentrating, swelling of the abdomen, swelling of the lower extremities, hematemesis, or hematochezia. ALLERGIES  Allergies   Allergen Reactions    Alcohol Hives and Itching     Wool alcohol, found in lotions and soaps    Other Medication Hives     Antibiotic,  \"Nedrine\"       MEDICATIONS  Current Outpatient Prescriptions   Medication Sig    venlafaxine (EFFEXOR) 37.5 mg tablet Take 37.5 mg by mouth three (3) times daily.  doxycycline (MONODOX) 100 mg capsule Take 100 mg by mouth two (2) times a day.  LACTOBACILLUS ACIDOPHILUS (PROBIOTIC PO) Take  by mouth. No current facility-administered medications for this visit. SYSTEM REVIEW NOT RELATED TO LIVER DISEASE OR REVIEWED ABOVE:  Constitution systems: Negative for fever, chills, weight gain, weight loss. Eyes: Negative for visual changes. ENT: Negative for sore throat, painful swallowing.    Respiratory: Negative for cough, hemoptysis, SOB. Cardiology: Negative for chest pain, palpitations. GI:  Negative for constipation or diarrhea. : Negative for urinary frequency, dysuria, hematuria, nocturia. Skin: Negative for rash. Hematology: Negative for easy bruising, blood clots. Musculo-skelatal: Negative for back pain, muscle pain, weakness. Neurologic: Negative for headaches, dizziness, vertigo, memory problems not related to HE. Psychology: Negative for anxiety, depression. FAMILY HISTORY:  The patient has no knowledge of the father's medical condition. The mother has the following chronic diseases: HTN. There is no family history of liver disease. SOCIAL HISTORY:  The patient is . The spouse has not been tested for HCV   The patient has 3 children, and 5 grandchildren. The patient currently smokes half pack of tobacco daily. The patient consumes 2 pints plus some beer weekly. The patient used to work as a  for [employer]. The patient retired in 2013. PHYSICAL EXAMINATION:  /61 (BP 1 Location: Left arm, BP Patient Position: Sitting)  Pulse 67  Temp 97.8 °F (36.6 °C) (Tympanic)   Ht 5' 5\" (1.651 m)  Wt 142 lb (64.4 kg)  SpO2 99%  BMI 23.63 kg/m2  General: No acute distress. Eyes: Sclera anicteric. ENT: No oral lesions. Thyroid normal.  Nodes: No adenopathy. Skin: No spider angiomata. No jaundice. No palmar erythema. Respiratory: Lungs clear to auscultation. Cardiovascular: Regular heart rate. No murmurs. No JVD. Abdomen: Soft non-tender. Liver size normal to percussion/palpation. Spleen not palpable. No obvious ascites. Extremities: No edema. No muscle wasting. No gross arthritic changes. Neurologic: Alert and oriented. Cranial nerves grossly intact. No asterixis.     LABORATORY STUDIES:  [facility] of [address] [place] [address] Units 11/27/2017 9/19/2017   WBC 4.6 - 13.2 K/uL 4.2 (L) 5.3   ANC 1.8 - 8.0 K/UL 1.8 2. 6   HGB 12.0 - 16.0 g/dL 12.0 12.6    - 420 K/uL 196 169   AST 15 - 37 U/L 24 71 (H)   ALT 13 - 56 U/L 27 72 (H)   Alk Phos 45 - 117 U/L 67 63   Bili, Total 0.2 - 1.0 MG/DL 0.4 0.5   Bili, Direct 0.0 - 0.2 MG/DL 0.1 0.1   Albumin 3.4 - 5.0 g/dL 3.2 (L) 3.6   BUN 7.0 - 18 MG/DL 15 16   Creat 0.6 - 1.3 MG/DL 0.88 0.93   Na 136 - 145 mmol/L 139 140   K 3.5 - 5.5 mmol/L 4.2 4.2   Cl 100 - 108 mmol/L 102 104   CO2 21 - 32 mmol/L 26 26   Glucose 74 - 99 mg/dL 88 86     Liver [facility] Latest Ref Rng & Units 5/16/2017   WBC 4.6 - 13.2 K/uL 3.8 (L)   ANC 1.8 - 8.0 K/UL 0.7 (L)   HGB 12.0 - 16.0 g/dL 12.5    - 420 K/uL 159   AST 15 - 37 U/L 88 (H)   ALT 13 - 56 U/L 65 (H)   Alk Phos 45 - 117 U/L 61   Bili, Total 0.2 - 1.0 MG/DL 0.4   Bili, Direct 0.0 - 0.2 MG/DL 0.1   Albumin 3.4 - 5.0 g/dL 3.6   BUN 7.0 - 18 MG/DL 8   Creat 0.6 - 1.3 MG/DL 0.84   Na 136 - 145 mmol/L 144   K 3.5 - 5.5 mmol/L 3.7   Cl 100 - 108 mmol/L 106   CO2 21 - 32 mmol/L 28   Glucose 74 - 99 mg/dL 86     SEROLOGIES:  Serologies Latest Ref Rng & Units 5/16/2017   Hep A Ab, Total NEGATIVE   NEGATIVE   Hep B Core Ab, Total NEGATIVE   NEGATIVE   Hep B Surface Ab >10.0 mIU/mL <3.10 (L)   Hep B Surface Ab Interp POS   NEGATIVE (A)   Hep C Genotype  1b   HCV RT-PCR, Quant IU/mL [id number]   Ferritin 8 - 388 NG/ (H)   Iron % Saturation % 38     LIVER HISTOLOGY:  06/2017. Shear wave elastography. E Range: 4.61-7.81 kPa, E Mean: 6.05 kPa, E Median: 5.89 kPa, E Std: 0.94 kPa. ENDOSCOPIC PROCEDURES:  Not available or performed    RADIOLOGY:  06/2017. Ultrasound of the liver. No suspicious hepatic mass. Simple appearing hepatic cysts. OTHER TESTING:  Not available or performed    ASSESSMENT AND PLAN:  Chronic HCV with portal fibrosis.   Today's laboratory studies indicate that the liver transaminases are normal, alkaline phosphatase is normal, tests of hepatic synthetic and metabolic function are normal, and the platelet count is normal. HCV RNA still pending. Recent ultrasound and shear wave elasatography suggest mild hepatic fibrosis. No suspicious liver masses. Simple appearing liver cysts. HCV. The patient has genotype 1B and is treatment naive. The patient began HCV treatment on 10/26/2017 with once daily Harvoni. She will be treated for 8 weeks total.  She has some complaints of nausea, headache, and fatigue. Headache. Encouraged the patient to drink more water. I would like her to drink 48 to 64 ounces of water daily. Nausea. I encouraged the patient to take the Mordecai Meigs with some food. Fatigue. I encouraged the patient to get what most needs to be done when she has the energy. I explained the fatigue is a common side effect of the treatment. Encouraged naps. The patient was directed to continue all current medications at the current dosages. There are no contraindications for the patient to take any medications that are necessary for treatment of other medical issues. The patient was instructed not to start any PPI while on treatment. The patient was instructed not to take any antacids within 4 hours of taking the Harvoni. The patient verbalized understanding of these instructions. The patient was counseled regarding alcohol consumption. The patient was counseled regarding use of illicit drugs. Vaccination for viral hepatitis A and B is recommended since the patient has no serologic evidence of previous exposure or vaccination with immunity. All of the above issues were discussed with the patient. All questions were answered. The patient expressed a clear understanding of the above. [address] [place] [address] in 16 weeks to assess for SVR 12.       Mandy Cooper NP   Liver [place] of [address] [place] [address], [address] [place] [address] Joi Almeida, [address] [place] [address]   [phone]

## 2017-11-27 NOTE — MR AVS SNAPSHOT
Visit Information Date & Time Provider Department Dept. Phone Encounter #  
 11/27/2017  3:00 PM Compa Landis NP [address] [id number] Follow-up Instructions Return in about 16 weeks (around 3/19/2018). Your Appointments 2/27/2018 11:30 AM  
[place] [id number] [facility] ([facility]) Appt Note: 16 Week follow up  
 [address], [place] [address] 98 [facility] 322 [facility]  
  
   
 [address] [address]  
  
    
 3/6/2018 11:30 AM  
[place] [id number] [facility] ([place] [facility]) Appt Note: 24wk f/up [address], [place] [address] [place] [address]  
[phone] Upcoming Health Maintenance Date Due Pneumococcal 19-64 Medium Risk (1 of 1 - PPSV23) 10/29/1976 DTaP/Tdap/Td series (1 - Tdap) 10/29/1978 PAP AKA CERVICAL CYTOLOGY 10/29/1978 BREAST CANCER SCRN MAMMOGRAM 10/29/2007 FOBT Q 1 YEAR AGE 50-75 10/29/2007 Influenza Age 5 to Adult 8/1/2017 ZOSTER VACCINE AGE 60> 8/29/2017 Allergies as of 11/27/2017  Review Complete On: 9/19/2017 By: Jose Grey Severity Noted Reaction Type Reactions Alcohol  06/05/2015    Hives, Itching Wool alcohol, found in lotions and soaps Other Medication  06/02/2015    Hives Antibiotic,  \"Nedrine\" Current Immunizations  Never Reviewed No immunizations on file. Not reviewed this visit You Were Diagnosed With   
  
 Codes Comments Chronic hepatitis C without hepatic coma (HCC)    -  Primary ICD-10-CM: B18.2 ICD-9-CM: 070.54 Vitals BP Pulse Temp Height(growth percentile) Weight(growth percentile)  
 115/61 (BP 1 Location: Left arm, BP Patient Position: Sitting) 67 97.8 °F (36.6 °C) (Tympanic) 5' 5\" (1.651 m) 142 lb (64.4 kg) SpO2 BMI OB Status Smoking Status 99% 23.63 kg/m2 Hysterectomy Current Every Day Smoker Vitals History BMI and BSA Data Body Mass Index Body Surface Area  
 23.63 kg/m 2 1.72 m 2 Preferred Pharmacy Pharmacy Name Phone Cari Kohler @ [facility], [address] [place] [address] Sd Doe [phone] Your Updated Medication List  
  
   
This list is accurate as of: 11/27/17  3:36 PM.  Always use your most recent med list.  
  
  
  
  
 doxycycline 100 mg capsule Commonly known as:  Daved Pollen Take 100 mg by mouth two (2) times a day. PROBIOTIC PO Take  by mouth. venlafaxine 37.5 mg tablet Commonly known as:  [facility] Take 37.5 mg by mouth three (3) times daily. Follow-up Instructions Return in about 16 weeks (around 3/19/2018). Introducing hospitals & [facility] SERVICES! Gabe Stewart introduces [employer] patient portal. Now you can access parts of your medical record, email your doctor's office, and request medication refills online. 1. In your internet browser, go to [email or web address] 2. Click on the First Time User? Click Here link in the Sign In box. You will see the New Member Sign Up page. 3. Enter your [employer] Access Code exactly as it appears below. You will not need to use this code after youve completed the sign-up process. If you do not sign up before the expiration date, you must request a new code. · [employer] Access Code: [email or web address] Expires: 12/18/2017  2:58 PM 
 
4. Enter the last four digits of your Social Security Number (xxxx) and Date of Birth (mm/dd/yyyy) as indicated and click Submit. You will be taken to the next sign-up page. 5. Create a [employer] ID. This will be your [employer] login ID and cannot be changed, so think of one that is secure and easy to remember. 6. Create a [employer] password. You can change your password at any time. 7. Enter your Password Reset Question and Answer. This can be used at a later time if you forget your password. 8. Enter your e-mail address. You will receive e-mail notification when new information is available in [address] 9. Click Sign Up. You can now view and download portions of your medical record. 10. Click the Download Summary menu link to download a portable copy of your medical information. If you have questions, please visit the Frequently Asked Questions section of the [employer] website. Remember, [employer] is NOT to be used for urgent needs. For medical emergencies, dial 911. Now available from your iPhone and Android! Please provide this summary of care documentation to your next provider. Your primary care clinician is listed as Augustine Holstein. If you have any questions after today's visit, please call [phone].

## 2017-12-19 LAB
HCV GRAPH, HCVGR: NORMAL
HCV RNA SERPL NAA+PROBE-ACNC: NORMAL IU/ML
SERIAL MONITORING: NORMAL

## 2018-02-27 ENCOUNTER — OFFICE VISIT (OUTPATIENT)
Dept: HEMATOLOGY | Age: 63
End: 2018-02-27

## 2018-02-27 ENCOUNTER — HOSPITAL ENCOUNTER (OUTPATIENT)
Dept: LAB | Age: 63
Discharge: HOME | End: 2018-02-27
Payer: COMMERCIAL

## 2018-02-27 VITALS
RESPIRATION RATE: 16 BRPM | OXYGEN SATURATION: 98 % | WEIGHT: 153 LBS | HEART RATE: 93 BPM | DIASTOLIC BLOOD PRESSURE: 60 MMHG | BODY MASS INDEX: 25.49 KG/M2 | HEIGHT: 65 IN | SYSTOLIC BLOOD PRESSURE: 101 MMHG | TEMPERATURE: 97.2 F

## 2018-02-27 DIAGNOSIS — B18.2 CHRONIC HEPATITIS C WITHOUT HEPATIC COMA (HCC): Primary | ICD-10-CM

## 2018-02-27 DIAGNOSIS — B18.2 CHRONIC HEPATITIS C WITHOUT HEPATIC COMA (HCC): ICD-10-CM

## 2018-02-27 LAB
ALBUMIN SERPL-MCNC: 3.6 G/DL (ref 3.4–5)
ALBUMIN/GLOB SERPL: 1.2 {RATIO} (ref 0.8–1.7)
ALP SERPL-CCNC: 69 U/L (ref 45–117)
ALT SERPL-CCNC: 22 U/L (ref 13–56)
ANION GAP SERPL CALC-SCNC: 8 MMOL/L (ref 3–18)
AST SERPL-CCNC: 17 U/L (ref 15–37)
BASOPHILS # BLD: 0 K/UL (ref 0–0.06)
BASOPHILS NFR BLD: 0 % (ref 0–2)
BILIRUB DIRECT SERPL-MCNC: 0.1 MG/DL (ref 0–0.2)
BILIRUB SERPL-MCNC: 0.2 MG/DL (ref 0.2–1)
BUN SERPL-MCNC: 11 MG/DL (ref 7–18)
BUN/CREAT SERPL: 13 (ref 12–20)
CALCIUM SERPL-MCNC: 10.3 MG/DL (ref 8.5–10.1)
CHLORIDE SERPL-SCNC: 106 MMOL/L (ref 100–108)
CO2 SERPL-SCNC: 28 MMOL/L (ref 21–32)
CREAT SERPL-MCNC: 0.84 MG/DL (ref 0.6–1.3)
DIFFERENTIAL METHOD BLD: ABNORMAL
EOSINOPHIL # BLD: 0.1 K/UL (ref 0–0.4)
EOSINOPHIL NFR BLD: 1 % (ref 0–5)
ERYTHROCYTE [DISTWIDTH] IN BLOOD BY AUTOMATED COUNT: 12.7 % (ref 11.6–14.5)
GLOBULIN SER CALC-MCNC: 3 G/DL (ref 2–4)
GLUCOSE SERPL-MCNC: 76 MG/DL (ref 74–99)
HCT VFR BLD AUTO: 32.3 % (ref 35–45)
HGB BLD-MCNC: 10.3 G/DL (ref 12–16)
LYMPHOCYTES # BLD: 2.9 K/UL (ref 0.9–3.6)
LYMPHOCYTES NFR BLD: 42 % (ref 21–52)
MCH RBC QN AUTO: 29.4 PG (ref 24–34)
MCHC RBC AUTO-ENTMCNC: 31.9 G/DL (ref 31–37)
MCV RBC AUTO: 92.3 FL (ref 74–97)
MONOCYTES # BLD: 0.5 K/UL (ref 0.05–1.2)
MONOCYTES NFR BLD: 8 % (ref 3–10)
NEUTS SEG # BLD: 3.5 K/UL (ref 1.8–8)
NEUTS SEG NFR BLD: 49 % (ref 40–73)
PLATELET # BLD AUTO: 243 K/UL (ref 135–420)
PMV BLD AUTO: 10.4 FL (ref 9.2–11.8)
POTASSIUM SERPL-SCNC: 4.4 MMOL/L (ref 3.5–5.5)
PROT SERPL-MCNC: 6.6 G/DL (ref 6.4–8.2)
RBC # BLD AUTO: 3.5 M/UL (ref 4.2–5.3)
SODIUM SERPL-SCNC: 142 MMOL/L (ref 136–145)
WBC # BLD AUTO: 7 K/UL (ref 4.6–13.2)

## 2018-02-27 PROCEDURE — 80048 BASIC METABOLIC PNL TOTAL CA: CPT | Performed by: NURSE PRACTITIONER

## 2018-02-27 PROCEDURE — 80076 HEPATIC FUNCTION PANEL: CPT | Performed by: NURSE PRACTITIONER

## 2018-02-27 PROCEDURE — 85025 COMPLETE CBC W/AUTO DIFF WBC: CPT | Performed by: NURSE PRACTITIONER

## 2018-02-27 PROCEDURE — 87522 HEPATITIS C REVRS TRNSCRPJ: CPT | Performed by: NURSE PRACTITIONER

## 2018-02-27 PROCEDURE — 36415 COLL VENOUS BLD VENIPUNCTURE: CPT | Performed by: NURSE PRACTITIONER

## 2018-02-27 RX ORDER — TOLTERODINE 4 MG/1
CAPSULE, EXTENDED RELEASE ORAL
COMMUNITY
Start: 2018-02-19

## 2018-02-27 RX ORDER — TRAZODONE HYDROCHLORIDE 50 MG/1
100 TABLET ORAL
COMMUNITY

## 2018-02-27 RX ORDER — HYDROXYZINE PAMOATE 50 MG/1
CAPSULE ORAL
COMMUNITY
Start: 2018-01-26

## 2018-02-27 RX ORDER — FOLIC ACID 1 MG/1
TABLET ORAL
Refills: 0 | COMMUNITY
Start: 2018-01-13

## 2018-02-27 RX ORDER — TRAZODONE HYDROCHLORIDE 50 MG/1
TABLET ORAL
COMMUNITY
Start: 2018-01-26 | End: 2018-02-27 | Stop reason: SDUPTHER

## 2018-02-27 RX ORDER — BUSPIRONE HYDROCHLORIDE 5 MG/1
5 TABLET ORAL
COMMUNITY

## 2018-02-27 NOTE — PROGRESS NOTES
[address] MD [place], [address], [facility], [place]       IBAN Gaspar PA-C Dante Derry, [facility]   IBAN Black NP        at [address], [address] [address]     [phone]     FAX: [phone]    at [facility], [address] [place] [address], [address]     [phone]     FAX: [phone]       Patient Care Team:  Angelic Snow MD as PCP - General (Family Practice)      Problem List  Date Reviewed: 2/27/2018          Codes Class Noted    Chronic hepatitis C [facility]) ICD-10-CM: B18.2  ICD-9-CM: 070.54  5/16/2017        Depression ICD-10-CM: F32.9  ICD-9-CM: 908  5/16/2017        Alopecia ICD-10-CM: L65.9  ICD-9-CM: 704.00  5/16/2017        S/P ALKA (total abdominal hysterectomy) ICD-10-CM: Z90.710  ICD-9-CM: V88.01  5/16/2017        Hidradenitis suppurativa of left axilla ICD-10-CM: L73.2  ICD-9-CM: 705.83  6/5/2015              Thomas Bring returns to the [facility] today for education and management of chronic hepatitis C. The patient began HCV treatment on 10/26/2017 with once daily Harvoni and completed the prescribed 8 weeks regime on December, 20th, 2017. She was treated with Harvoni. This is the only time the HCV has ever been treated. The active problem list, all pertinent past medical history, medications, liver histology, endoscopic studies, radiologic findings and laboratory findings related to the liver disorder were reviewed with the patient. The patient is a 63 y.o. Black female who was noted to have abnormalities in liver chemistries and subsequently tested positive for chronic HCV in 4/2017. Risk factors for acquiring HCV are inhaling cocaine in 1970s. There was no history of acute incteric hepatitis at the time of these risk factors.       The patient reports that she recently returned from in-patient rehab for Cocaine and alcohol abuse. [place] Imaging of the liver was performed with ultrasound in 06/2017. Liver demonstrates anechoic cysts in the right lobe, largest measuring  1.7 cm, and mildly coarse parenchymal background. No suspicious hepatic mass. Shear wave elastography was performed with the ultrasound. This suggests the liver has mild fibrosis, F1 on the Metavir scale. A liver biopsy has not been performed. The most recent laboratory studies indicate that the liver transaminases are normal, alkaline phosphatase is normal, tests of hepatic synthetic and metabolic function are normal, and the platelet count is normal.      The patient has no complaints which can be attributed to liver disease. The patient has Moderate limitations in functional activities which can be attributed to to other medical problems that are not related to the liver disease. The patient has not experienced fatigue, fevers, chills, shortness of breath, chest pain, pain in the right side over the liver, diffuse abdominal pain, nausea, vomiting, constipation, diarrhrea, dry eyes, dry mouth, arthralgias, myalgias, yellowing of the eyes or skin, itching, dark urine, problems concentrating, swelling of the abdomen, swelling of the lower extremities, hematemesis, or hematochezia. ALLERGIES  Allergies   Allergen Reactions    Alcohol Hives and Itching     Wool alcohol, found in lotions and soaps    Other Medication Hives     Antibiotic,  \"Nedrine\"       MEDICATIONS  Current Outpatient Prescriptions   Medication Sig    folic acid (FOLVITE) 1 mg tablet TAKE 1 TABLET BY MOUTH EVERY DAY    traZODone (DESYREL) 50 mg tablet Take 100 mg by mouth.  tolterodine ER (DETROL LA) 4 mg ER capsule     hydrOXYzine pamoate (VISTARIL) 50 mg capsule     busPIRone (BUSPAR) 5 mg tablet Take 5 mg by mouth.     venlafaxine (EFFEXOR) 37.5 mg tablet Take 37.5 mg by mouth three (3) times daily.    doxycycline (MONODOX) 100 mg capsule Take 100 mg by mouth two (2) times a day. No current facility-administered medications for this visit. SYSTEM REVIEW NOT RELATED TO LIVER DISEASE OR REVIEWED ABOVE:  Constitution systems: Negative for fever, chills, weight gain, weight loss. Eyes: Negative for visual changes. ENT: Negative for sore throat, painful swallowing. Respiratory: Negative for cough, hemoptysis, SOB. Cardiology: Negative for chest pain, palpitations. GI:  Negative for constipation or diarrhea. : Negative for urinary frequency, dysuria, hematuria, nocturia. Skin: Negative for rash. Hematology: Negative for easy bruising, blood clots. Musculo-skelatal: Negative for back pain, muscle pain, weakness. Neurologic: Negative for headaches, dizziness, vertigo, memory problems not related to HE. Psychology: Negative for anxiety, depression. FAMILY HISTORY:  The patient has no knowledge of the father's medical condition. The mother has the following chronic diseases: HTN. There is no family history of liver disease. SOCIAL HISTORY:  The patient is . The spouse has not been tested for HCV   The patient has 3 children, and 5 grandchildren. The patient currently smokes half pack of tobacco daily. The patient consumes 2 pints plus some beer weekly. The patient used to work as a  for [employer]. The patient retired in 2013. PHYSICAL EXAMINATION:  /60 (BP 1 Location: Right arm, BP Patient Position: Sitting)  Pulse 93  Temp 97.2 °F (36.2 °C) (Tympanic)   Resp 16  Ht 5' 5\" (1.651 m)  Wt 153 lb (69.4 kg)  SpO2 98%  BMI 25.46 kg/m2  General: No acute distress. Eyes: Sclera anicteric. ENT: No oral lesions. Thyroid normal.  Nodes: No adenopathy. Skin: No spider angiomata. No jaundice. No palmar erythema. Respiratory: Lungs clear to auscultation. Cardiovascular: Regular heart rate. No murmurs.   No JVD.  Abdomen: Soft non-tender. Liver size normal to percussion/palpation. Spleen not palpable. No obvious ascites. Extremities: No edema. No muscle wasting. No gross arthritic changes. Neurologic: Alert and oriented. Cranial nerves grossly intact. No asterixis. LABORATORY STUDIES:  Liver [place] of [address] & Units 2/27/2018 11/27/2017   WBC 4.6 - 13.2 K/uL 7.0 4.2 (L)   ANC 1.8 - 8.0 K/UL 3.5 1.8   HGB 12.0 - 16.0 g/dL 10.3 (L) 12.0    - 420 K/uL 243 196   AST 15 - 37 U/L 17 24   ALT 13 - 56 U/L 22 27   Alk Phos 45 - 117 U/L 69 67   Bili, Total 0.2 - 1.0 MG/DL 0.2 0.4   Bili, Direct 0.0 - 0.2 MG/DL 0.1 0.1   Albumin 3.4 - 5.0 g/dL 3.6 3.2 (L)   BUN 7.0 - 18 MG/DL 11 15   Creat 0.6 - 1.3 MG/DL 0.84 0.88   Na 136 - 145 mmol/L 142 139   K 3.5 - 5.5 mmol/L 4.4 4.2   Cl 100 - 108 mmol/L 106 102   CO2 21 - 32 mmol/L 28 26   Glucose 74 - 99 mg/dL 76 88     SEROLOGIES:  Serologies Latest Ref Rng & Units 5/16/2017   Hep A Ab, Total NEGATIVE   NEGATIVE   Hep B Core Ab, Total NEGATIVE   NEGATIVE   Hep B Surface Ab >10.0 mIU/mL <3.10 (L)   Hep B Surface Ab Interp POS   NEGATIVE (A)   Hep C Genotype  1b   HCV RT-PCR, Quant IU/mL [id number]   Ferritin 8 - 388 NG/ (H)   Iron % Saturation % 38     LIVER HISTOLOGY:  06/2017. Shear wave elastography. E Range: 4.61-7.81 kPa, E Mean: 6.05 kPa, E Median: 5.89 kPa, E Std: 0.94 kPa. ENDOSCOPIC PROCEDURES:  Not available or performed    RADIOLOGY:  06/2017. Ultrasound of the liver. No suspicious hepatic mass. Simple appearing hepatic cysts. OTHER TESTING:  Not available or performed    ASSESSMENT AND PLAN:  Chronic HCV with portal fibrosis. Today's laboratory studies indicate that the liver transaminases are normal, alkaline phosphatase is normal, tests of hepatic synthetic and metabolic function are normal, and the platelet count is normal. HCV RNA still pending.       Recent ultrasound and shear wave elasatography suggest mild hepatic fibrosis. No suspicious liver masses. Simple appearing liver cysts. HCV. The patient has genotype 1B and is treatment naive. The patient was treated with 8 weeks of Debera Kill from 10/26/2017 to 12/20/2017. She denies missing any doses. She has some complaints of nausea, headache, and fatigue. She evidently was using both cocaine and alcohol during the treatment because she reports that she just completed in-patient rehab for cocaine and alcohol abuse. Headache. Resolved post treatment. Nausea. Resolved post treatment. Fatigue. Resolved post treatment. The patient was directed to continue all current medications at the current dosages. There are no contraindications for the patient to take any medications that are necessary for treatment of other medical issues. The patient was counseled regarding alcohol consumption. The patient was counseled regarding use of illicit drugs. Vaccination for viral hepatitis A and B is recommended since the patient has no serologic evidence of previous exposure or vaccination with immunity. All of the above issues were discussed with the patient. All questions were answered. The patient expressed a clear understanding of the above. [address] [address] in 10 months to assess for continued SVR one year after completing HCV treatment.       Saida Muniz NP   Liver [place] of [address] [place] [address] [place]ST, [address] [address] Joi Almeida, [address] [facility]   [phone]

## 2018-02-27 NOTE — PROGRESS NOTES
Markos Gallegos is a 63 y.o. female    No chief complaint on file. 1. Have you been to the ER, urgent care clinic or hospitalized since your last visit? NO.     2. Have you seen or consulted any other health care providers outside of the [address] [place] [address] since your last visit (Include any pap smears or colon screening)?  NO        Learning Assessment 2/27/2018   PRIMARY LEARNER Patient   BARRIERS PRIMARY LEARNER NONE   CO-LEARNER CAREGIVER No   PRIMARY LANGUAGE ENGLISH   LEARNER PREFERENCE PRIMARY LISTENING   ANSWERED BY PATIENT   RELATIONSHIP SELF

## 2018-02-27 NOTE — MR AVS SNAPSHOT
[address] [address] 
[phone] Patient: Rey Cordero MRN: [id number] :1955 Visit Information Date & Time Provider Department Dept. Phone Encounter #  
 2018 11:30 AM IBAN Ann 13 of [facility] [id number] Follow-up Instructions Return in about 10 months (around 2018). Your Appointments 3/6/2018 11:30 AM  
[place] [address] [facility] ([facility]) Appt Note: 24wk f/up [address], [facility] 313 98 [facility] 322 [facility]  
  
   
 1200 [facility], [address] [place] [address] Upcoming Health Maintenance Date Due Pneumococcal 19-64 Medium Risk (1 of 1 - PPSV23) 10/29/1976 DTaP/Tdap/Td series (1 - Tdap) 10/29/1978 PAP AKA CERVICAL CYTOLOGY 10/29/1978 BREAST CANCER SCRN MAMMOGRAM 10/29/2007 FOBT Q 1 YEAR AGE 50-75 10/29/2007 Influenza Age 5 to Adult 2017 ZOSTER VACCINE AGE 60> 2017 Allergies as of 2018  Review Complete On: 2018 By: Julio Cesar Juarez Severity Noted Reaction Type Reactions Alcohol  2015    Hives, Itching Wool alcohol, found in lotions and soaps Other Medication  2015    Hives Antibiotic,  \"Nedrine\" Current Immunizations  Never Reviewed No immunizations on file. Not reviewed this visit You Were Diagnosed With   
  
 Codes Comments Chronic hepatitis C without hepatic coma (HCC)    -  Primary ICD-10-CM: B18.2 ICD-9-CM: 070.54 Vitals BP Pulse Temp Resp Height(growth percentile) 101/60 (BP 1 Location: Right arm, BP Patient Position: Sitting) 93 97.2 °F (36.2 °C) (Tympanic) 16 5' 5\" (1.651 m) Weight(growth percentile) SpO2 BMI OB Status Smoking Status 153 lb (69.4 kg) 98% 25.46 kg/m2 Hysterectomy Current Every Day Smoker Vitals History BMI and BSA Data Body Mass Index Body Surface Area  
 25.46 kg/m 2 1.78 m 2 Preferred Pharmacy Pharmacy Name Phone Cari Kohler @ [address] [facility], [address] [place] [address] Kelly Engel [phone] Your Updated Medication List  
  
   
This list is accurate as of 2/27/18 11:51 AM.  Always use your most recent med list.  
  
  
  
  
 busPIRone 5 mg tablet Commonly known as:  BUSPAR Take 5 mg by mouth. doxycycline 100 mg capsule Commonly known as:  Lorri Phi Take 100 mg by mouth two (2) times a day. folic acid 1 mg tablet Commonly known as:  FOLVITE  
TAKE 1 TABLET BY MOUTH EVERY DAY  
  
 hydrOXYzine pamoate 50 mg capsule Commonly known as:  VISTARIL  
  
 tolterodine ER 4 mg ER capsule Commonly known as:  DETROL LA  
  
 traZODone 50 mg tablet Commonly known as:  Carletha Ramsey Take 100 mg by mouth. venlafaxine 37.5 mg tablet Commonly known as:  [facility] Take 37.5 mg by mouth three (3) times daily. Follow-up Instructions Return in about 10 months (around 12/27/2018). To-Do List   
 02/27/2018 Lab:  CBC WITH AUTOMATED DIFF   
  
 02/27/2018 Lab:  HCV RT-PCR, QUANT (NON-GRAPH)   
  
 02/27/2018 Lab:  HEPATIC FUNCTION PANEL   
  
 02/27/2018 Lab:  METABOLIC PANEL, BASIC Introducing [facility]! Adeline Zarate introduces [employer] patient portal. Now you can access parts of your medical record, email your doctor's office, and request medication refills online. 1. In your internet browser, go to [email or web address] 2. Click on the First Time User? Click Here link in the Sign In box. You will see the New Member Sign Up page. 3. Enter your [employer] Access Code exactly as it appears below. You will not need to use this code after youve completed the sign-up process.  If you do not sign up before the expiration date, you must request a new code. · [employer] Access Code: [email or web address] Expires: 3/17/2018 12:53 PM 
 
4. Enter the last four digits of your Social Security Number (xxxx) and Date of Birth (mm/dd/yyyy) as indicated and click Submit. You will be taken to the next sign-up page. 5. Create a [employer] ID. This will be your [employer] login ID and cannot be changed, so think of one that is secure and easy to remember. 6. Create a [employer] password. You can change your password at any time. 7. Enter your Password Reset Question and Answer. This can be used at a later time if you forget your password. 8. Enter your e-mail address. You will receive e-mail notification when new information is available in [address] 9. Click Sign Up. You can now view and download portions of your medical record. 10. Click the Download Summary menu link to download a portable copy of your medical information. If you have questions, please visit the Frequently Asked Questions section of the [employer] website. Remember, [employer] is NOT to be used for urgent needs. For medical emergencies, dial 911. Now available from your iPhone and Android! Please provide this summary of care documentation to your next provider. Your primary care clinician is listed as Kevin Chappell. If you have any questions after today's visit, please call [phone].

## 2018-03-02 LAB
HCV RNA SERPL NAA+PROBE-LOG IU: NOT DETECTED HCV LOG 10IU/ML
HCV RNA SERPL PROBE AMP-ACNC: NOT DETECTED HCVIU/ML

## 2018-03-28 NOTE — PROGRESS NOTES
Please let her know that the HCV has been cured. Other labs are [facility]. Thank you.

## 2018-12-17 ENCOUNTER — OFFICE VISIT (OUTPATIENT)
Dept: HEMATOLOGY | Age: 63
End: 2018-12-17

## 2018-12-17 ENCOUNTER — HOSPITAL ENCOUNTER (OUTPATIENT)
Dept: LAB | Age: 63
Discharge: HOME | End: 2018-12-17
Payer: COMMERCIAL

## 2018-12-17 VITALS
RESPIRATION RATE: 16 BRPM | HEART RATE: 91 BPM | WEIGHT: 152 LBS | HEIGHT: 65 IN | TEMPERATURE: 97.8 F | SYSTOLIC BLOOD PRESSURE: 146 MMHG | OXYGEN SATURATION: 98 % | BODY MASS INDEX: 25.33 KG/M2 | DIASTOLIC BLOOD PRESSURE: 83 MMHG

## 2018-12-17 DIAGNOSIS — B18.2 CHRONIC HEPATITIS C WITHOUT HEPATIC COMA (HCC): Primary | ICD-10-CM

## 2018-12-17 DIAGNOSIS — B18.2 CHRONIC HEPATITIS C WITHOUT HEPATIC COMA (HCC): ICD-10-CM

## 2018-12-17 LAB
ALBUMIN SERPL-MCNC: 3.8 G/DL (ref 3.4–5)
ALBUMIN/GLOB SERPL: 1.1 {RATIO} (ref 0.8–1.7)
ALP SERPL-CCNC: 95 U/L (ref 45–117)
ALT SERPL-CCNC: 32 U/L (ref 13–56)
ANION GAP SERPL CALC-SCNC: 7 MMOL/L (ref 3–18)
AST SERPL-CCNC: 43 U/L (ref 15–37)
BASOPHILS # BLD: 0 K/UL (ref 0–0.1)
BASOPHILS NFR BLD: 1 % (ref 0–2)
BILIRUB DIRECT SERPL-MCNC: 0.3 MG/DL (ref 0–0.2)
BILIRUB SERPL-MCNC: 0.9 MG/DL (ref 0.2–1)
BUN SERPL-MCNC: 19 MG/DL (ref 7–18)
BUN/CREAT SERPL: 22
CALCIUM SERPL-MCNC: 8.9 MG/DL (ref 8.5–10.1)
CHLORIDE SERPL-SCNC: 104 MMOL/L (ref 100–108)
CO2 SERPL-SCNC: 26 MMOL/L (ref 21–32)
CREAT SERPL-MCNC: 0.88 MG/DL (ref 0.6–1.3)
DIFFERENTIAL METHOD BLD: ABNORMAL
EOSINOPHIL # BLD: 0.1 K/UL (ref 0–0.4)
EOSINOPHIL NFR BLD: 1 % (ref 0–5)
ERYTHROCYTE [DISTWIDTH] IN BLOOD BY AUTOMATED COUNT: 13.6 % (ref 11.6–14.5)
GLOBULIN SER CALC-MCNC: 3.5 G/DL (ref 2–4)
GLUCOSE SERPL-MCNC: 94 MG/DL (ref 74–99)
HCT VFR BLD AUTO: 37.9 % (ref 35–45)
HGB BLD-MCNC: 12.1 G/DL (ref 12–16)
LYMPHOCYTES # BLD: 2.6 K/UL (ref 0.9–3.6)
LYMPHOCYTES NFR BLD: 45 % (ref 21–52)
MCH RBC QN AUTO: 30.9 PG (ref 24–34)
MCHC RBC AUTO-ENTMCNC: 31.9 G/DL (ref 31–37)
MCV RBC AUTO: 96.9 FL (ref 74–97)
MONOCYTES # BLD: 0.4 K/UL (ref 0.05–1.2)
MONOCYTES NFR BLD: 8 % (ref 3–10)
NEUTS SEG # BLD: 2.6 K/UL (ref 1.8–8)
NEUTS SEG NFR BLD: 45 % (ref 40–73)
PLATELET # BLD AUTO: 242 K/UL (ref 135–420)
PMV BLD AUTO: 10.7 FL (ref 9.2–11.8)
POTASSIUM SERPL-SCNC: 3.9 MMOL/L (ref 3.5–5.5)
PROT SERPL-MCNC: 7.3 G/DL (ref 6.4–8.2)
RBC # BLD AUTO: 3.91 M/UL (ref 4.2–5.3)
SODIUM SERPL-SCNC: 137 MMOL/L (ref 136–145)
WBC # BLD AUTO: 5.7 K/UL (ref 4.6–13.2)

## 2018-12-17 PROCEDURE — 80048 BASIC METABOLIC PNL TOTAL CA: CPT

## 2018-12-17 PROCEDURE — 85025 COMPLETE CBC W/AUTO DIFF WBC: CPT

## 2018-12-17 PROCEDURE — 36415 COLL VENOUS BLD VENIPUNCTURE: CPT

## 2018-12-17 PROCEDURE — 80076 HEPATIC FUNCTION PANEL: CPT

## 2018-12-17 PROCEDURE — 87522 HEPATITIS C REVRS TRNSCRPJ: CPT

## 2018-12-17 NOTE — PROGRESS NOTES
[address] Deedee Hoffman MD, [address], [place], [place]       Moe Adair, TOMI Booth, [facility]   IBAN Calderón NP        at [address], [address] [address]     [phone]     FAX: [phone]    at [address], [address] [facility], [address]     [phone]     FAX: [phone]       Patient Care Team:  Delroy Siemens, MD as PCP - General (Family Practice)      Problem List  Date Reviewed: 2/27/2018          Codes Class Noted    Chronic hepatitis C [facility]) ICD-10-CM: B18.2  ICD-9-CM: 070.54  5/16/2017        Depression ICD-10-CM: F32.9  ICD-9-CM: 389  5/16/2017        Alopecia ICD-10-CM: L65.9  ICD-9-CM: 704.00  5/16/2017        S/P ALKA (total abdominal hysterectomy) ICD-10-CM: Z90.710  ICD-9-CM: V88.01  5/16/2017        Hidradenitis suppurativa of left axilla ICD-10-CM: L73.2  ICD-9-CM: 705.83  6/5/2015                Mason Mendez returns to the [facility] today for education and management of chronic hepatitis C. The patient began HCV treatment on 10/26/2017 with once daily Harvoni and completed the prescribed 8 weeks regime on December, 20th, 2017. She was SVR 12 in 02/27/2018. She was treated with Harvoni. This is the only time the HCV has ever been treated. The active problem list, all pertinent past medical history, medications, liver histology, endoscopic studies, radiologic findings and laboratory findings related to the liver disorder were reviewed with the patient. The patient is a 66 y.o. Black female who was noted to have abnormalities in liver chemistries and subsequently tested positive for chronic HCV in 4/2017. Risk factors for acquiring HCV are inhaling cocaine in 1970s. There was no history of acute incteric hepatitis at the time of these risk factors.       Imaging of the liver was performed with ultrasound in 06/2017. Liver demonstrates anechoic cysts in the right lobe, largest measuring 1.7 cm, and mildly coarse parenchymal background. No suspicious hepatic mass. Shear wave elastography was performed with the ultrasound. This suggests the liver has mild fibrosis, F1 on the Metavir scale. A liver biopsy has not been performed. The most recent laboratory studies indicate that the liver transaminases are normal, alkaline phosphatase is normal, tests of hepatic synthetic and metabolic function are normal, and the platelet count is normal.      The patient has no complaints which can be attributed to liver disease. The patient has Moderate limitations in functional activities which can be attributed to to other medical problems that are not related to the liver disease. The patient has not experienced fatigue, fevers, chills, shortness of breath, chest pain, pain in the right side over the liver, diffuse abdominal pain, nausea, vomiting, constipation, diarrhrea, dry eyes, dry mouth, arthralgias, myalgias, yellowing of the eyes or skin, itching, dark urine, problems concentrating, swelling of the abdomen, swelling of the lower extremities, hematemesis, or hematochezia. ALLERGIES  Allergies   Allergen Reactions    Alcohol Hives and Itching     Wool alcohol, found in lotions and soaps    Other Medication Hives     Antibiotic,  \"Nedrine\"       MEDICATIONS  Current Outpatient Medications   Medication Sig    folic acid (FOLVITE) 1 mg tablet TAKE 1 TABLET BY MOUTH EVERY DAY    traZODone (DESYREL) 50 mg tablet Take 100 mg by mouth.  tolterodine ER (DETROL LA) 4 mg ER capsule     hydrOXYzine pamoate (VISTARIL) 50 mg capsule     busPIRone (BUSPAR) 5 mg tablet Take 5 mg by mouth.  venlafaxine (EFFEXOR) 37.5 mg tablet Take 37.5 mg by mouth three (3) times daily.  doxycycline (MONODOX) 100 mg capsule Take 100 mg by mouth two (2) times a day.      No current facility-administered medications for this visit. SYSTEM REVIEW NOT RELATED TO LIVER DISEASE OR REVIEWED ABOVE:  Constitution systems: Negative for fever, chills, weight gain, weight loss. Eyes: Negative for visual changes. ENT: Negative for sore throat, painful swallowing. Respiratory: Negative for cough, hemoptysis, SOB. Cardiology: Negative for chest pain, palpitations. GI:  Negative for constipation or diarrhea. : Negative for urinary frequency, dysuria, hematuria, nocturia. Skin: Negative for rash. Hematology: Negative for easy bruising, blood clots. Musculo-skelatal: Negative for back pain, muscle pain, weakness. Neurologic: Negative for headaches, dizziness, vertigo, memory problems not related to HE. Psychology: Negative for anxiety, depression. FAMILY HISTORY:  The patient has no knowledge of the father's medical condition. The mother has the following chronic diseases: HTN. There is no family history of liver disease. SOCIAL HISTORY:  The patient is . The spouse has not been tested for HCV   The patient has 3 children, and 5 grandchildren. The patient currently smokes half pack of tobacco daily. The patient consumes 2 pints plus some beer weekly. The patient used to work as a  for [employer]. The patient retired in 2013. PHYSICAL EXAMINATION:  /83 (BP 1 Location: Right arm, BP Patient Position: Sitting)   Pulse 91   Temp 97.8 °F (36.6 °C) (Tympanic)   Resp 16   Ht 5' 5\" (1.651 m)   Wt 152 lb (68.9 kg)   SpO2 98%   BMI 25.29 kg/m²   General: No acute distress. Eyes: Sclera anicteric. ENT: No oral lesions. Thyroid normal.  Nodes: No adenopathy. Skin: No spider angiomata. No jaundice. No palmar erythema. Respiratory: Lungs clear to auscultation. Cardiovascular: Regular heart rate. No murmurs. No JVD. Abdomen: Soft non-tender. Liver size normal to percussion/palpation. Spleen not palpable. No obvious ascites. Extremities: No edema. No muscle wasting. No gross arthritic changes. Neurologic: Alert and oriented. Cranial nerves grossly intact. No asterixis. LABORATORY STUDIES:  Liver [place] of [address] Units 12/17/2018 2/27/2018   WBC 4.6 - 13.2 K/uL 5.7 7.0   ANC 1.8 - 8.0 K/UL 2.6 3.5   HGB 12.0 - 16.0 g/dL 12.1 10.3 (L)    - 420 K/uL 242 243   AST 15 - 37 U/L 43 (H) 17   ALT 13 - 56 U/L 32 22   Alk Phos 45 - 117 U/L 95 69   Bili, Total 0.2 - 1.0 MG/DL 0.9 0.2   Bili, Direct 0.0 - 0.2 MG/DL 0.3 (H) 0.1   Albumin 3.4 - 5.0 g/dL 3.8 3.6   BUN 7.0 - 18 MG/DL 19 (H) 11   Creat 0.6 - 1.3 MG/DL 0.88 0.84   Na 136 - 145 mmol/L 137 142   K 3.5 - 5.5 mmol/L 3.9 4.4   Cl 100 - 108 mmol/L 104 106   CO2 21 - 32 mmol/L 26 28   Glucose 74 - 99 mg/dL 94 76     SEROLOGIES:  Serologies Latest Ref Rng & Units 5/16/2017   Hep A Ab, Total NEGATIVE   NEGATIVE   Hep B Core Ab, Total NEGATIVE   NEGATIVE   Hep B Surface Ab >10.0 mIU/mL <3.10 (L)   Hep B Surface Ab Interp POS   NEGATIVE (A)   Hep C Genotype  1b   HCV RT-PCR, Quant IU/mL [id number]   Ferritin 8 - 388 NG/ (H)   Iron % Saturation % 38     LIVER HISTOLOGY:  06/2017. Shear wave elastography. E Range: 4.61-7.81 kPa, E Mean: 6.05 kPa, E Median: 5.89 kPa, E Std: 0.94 kPa. ENDOSCOPIC PROCEDURES:  Not available or performed    RADIOLOGY:  06/2017. Ultrasound of the liver. No suspicious hepatic mass. Simple appearing hepatic cysts. OTHER TESTING:  Not available or performed    ASSESSMENT AND PLAN:  Chronic HCV with portal fibrosis. Today's laboratory studies indicate that the liver transaminases are normal, alkaline phosphatase is normal, tests of hepatic synthetic and metabolic function are normal, and the platelet count is normal. HCV RNA still pending. Recent ultrasound and shear wave elasatography suggest mild hepatic fibrosis. No suspicious liver masses. Simple appearing liver cysts. HCV.   The patient has genotype 1B and is treatment naive. The patient was treated with 8 weeks of Norlin Dominion from 10/26/2017 to 12/20/2017. She denies missing any doses. She has some complaints of nausea, headache, and fatigue. She evidently was using both cocaine and alcohol during the treatment because she reports that she just completed in-patient rehab for cocaine and alcohol abuse. The patient was SVR 12 in 02/27/2018. HCV has been eradicated. Headache. Resolved post treatment. Nausea. Resolved post treatment. Fatigue. Resolved post treatment. The patient was directed to continue all current medications at the current dosages. There are no contraindications for the patient to take any medications that are necessary for treatment of other medical issues. The patient was counseled regarding alcohol consumption. The patient was counseled regarding use of illicit drugs. Vaccination for viral hepatitis A and B is recommended since the patient has no serologic evidence of previous exposure or vaccination with immunity. All of the above issues were discussed with the patient. All questions were answered. The patient expressed a clear understanding of the above. Follow-up Alexei Minor 32 on a PRN basis.      Alysa Quick NP   Liver [place] of [address] [place] [address], [address] [address] Fab Yani Brice, [address] [facility]   [phone]

## 2018-12-17 NOTE — PROGRESS NOTES
Dayne Rosado is a 66 y.o. female      1. Have you been to the ER, urgent care clinic or hospitalized since your last visit? YES. Patient has been to [facility] ER since last visit for a fall. 2. Have you seen or consulted any other health care providers outside of the [address] [place] [address] since your last visit (Include any pap smears or colon screening)? YES    Yes patient has been to rehabilitation in [place] and pcp for bronchitis and jaw popping.            Learning Assessment 2/27/2018   PRIMARY LEARNER Patient   BARRIERS PRIMARY LEARNER NONE   CO-LEARNER CAREGIVER No   PRIMARY LANGUAGE ENGLISH   LEARNER PREFERENCE PRIMARY LISTENING   ANSWERED BY PATIENT   RELATIONSHIP SELF
